# Patient Record
Sex: MALE | Race: WHITE | URBAN - METROPOLITAN AREA
[De-identification: names, ages, dates, MRNs, and addresses within clinical notes are randomized per-mention and may not be internally consistent; named-entity substitution may affect disease eponyms.]

---

## 2024-05-01 LAB
CHOLEST SERPL-MSCNC: 84 MG/DL (ref 0–200)
CREATININE, URINE: 51 MG/DL (ref 40–120)
HBA1C MFR BLD: 7.7 % (ref 4–6)
HDLC SERPL-MCNC: 38 MG/DL (ref 35–70)
LDLC SERPL CALC-MCNC: 32 MG/DL (ref 0–160)
MICROALB/CREAT RATIO: 23.5 (ref 0–16.9)
MICROALBUMIN URINE: <12 (ref 0.02–29.9)
TRIGL SERPL-MCNC: 71 MG/DL (ref 40–160)

## 2024-09-12 ENCOUNTER — PATIENT OUTREACH (OUTPATIENT)
Dept: ADMINISTRATIVE | Facility: HOSPITAL | Age: 74
End: 2024-09-12

## 2024-09-12 NOTE — PROGRESS NOTES
Population Health Chart Review & Patient Outreach Details      Additional [facility] Notes:    Non-compliant report chart audits for Diabetes-Hemoglobin A1C Chart review completed for  test overdue (mammograms, Colonoscopies, pap smears, DM labs, and/or EYE EXAMs)      Care Everywhere and media, updates requested and reviewed.      [facility] and [facility] reviewed.              Updates Requested / Reviewed:      Care Everywhere, , and External Sources: [facility] Topics Overdue:      [facility] Score: 4     Colon Cancer Screening  Urine Screening  Eye Exam  Foot Exam    Influenza Vaccine  Tetanus Vaccine  Shingles/Zoster Vaccine  RSV Vaccine                  Health Maintenance Topic(s) Outreach Outcomes & Actions Taken:    Lab(s) - Outreach Outcomes & Actions Taken  : External Records Uploaded & Care Team Updated if Applicable